# Patient Record
Sex: FEMALE | Race: WHITE | NOT HISPANIC OR LATINO | Employment: PART TIME | URBAN - METROPOLITAN AREA
[De-identification: names, ages, dates, MRNs, and addresses within clinical notes are randomized per-mention and may not be internally consistent; named-entity substitution may affect disease eponyms.]

---

## 2017-01-26 ENCOUNTER — OFFICE VISIT (OUTPATIENT)
Dept: FAMILY MEDICINE | Facility: CLINIC | Age: 47
End: 2017-01-26
Payer: COMMERCIAL

## 2017-01-26 VITALS
WEIGHT: 177.75 LBS | DIASTOLIC BLOOD PRESSURE: 78 MMHG | SYSTOLIC BLOOD PRESSURE: 122 MMHG | HEART RATE: 72 BPM | BODY MASS INDEX: 26.94 KG/M2 | TEMPERATURE: 98.6 F | RESPIRATION RATE: 16 BRPM | HEIGHT: 68 IN

## 2017-01-26 DIAGNOSIS — F90.9 ATTENTION DEFICIT HYPERACTIVITY DISORDER (ADHD), UNSPECIFIED ADHD TYPE: Primary | ICD-10-CM

## 2017-01-26 PROCEDURE — 99213 OFFICE O/P EST LOW 20 MIN: CPT | Performed by: PHYSICIAN ASSISTANT

## 2017-01-26 RX ORDER — METHYLPHENIDATE HYDROCHLORIDE 20 MG/1
20 TABLET ORAL 2 TIMES DAILY
Qty: 60 TABLET | Refills: 0 | Status: SHIPPED | OUTPATIENT
Start: 2017-01-26 | End: 2017-02-23

## 2017-01-26 ASSESSMENT — PAIN SCALES - GENERAL: PAINLEVEL: NO PAIN (0)

## 2017-01-26 NOTE — MR AVS SNAPSHOT
"              After Visit Summary   2017    Isabel Hyatt    MRN: [id number]           Patient Information     Date Of Birth          1970        Visit Information        Provider Department      2017 6:30 PM Caren Carlos PA-C [facility]        Today's Diagnoses     Attention deficit hyperactivity disorder (ADHD), unspecified ADHD type    -  1        Follow-ups after your visit        Who to contact     If you have questions or need follow up information about today's clinic visit or your schedule please contact [facility] directly at [phone].  Normal or non-critical lab and imaging results will be communicated to you by AktiveBayhart, letter or phone within 4 business days after the clinic has received the results. If you do not hear from us within 7 days, please contact the clinic through AktiveBayhart or phone. If you have a critical or abnormal lab result, we will notify you by phone as soon as possible.  Submit refill requests through StageMark or call your pharmacy and they will forward the refill request to us. Please allow 3 business days for your refill to be completed.          Additional Information About Your Visit        MyChart Information     StageMark lets you send messages to your doctor, view your test results, renew your prescriptions, schedule appointments and more. To sign up, go to [email or web address] . Click on \"Log in\" on the left side of the screen, which will take you to the Welcome page. Then click on \"Sign up Now\" on the right side of the page.     You will be asked to enter the access code listed below, as well as some personal information. Please follow the directions to create your username and password.     Your access code is: [email or web address]  Expires: 2017  7:08 PM     Your access code will  in 90 days. If you need help or a new code, please call your [facility] or [phone].        Care EveryWhere ID     This is your " "Care EveryWhere ID. This could be used by other organizations to access your [place] medical records  [id number]        Your Vitals Were     Pulse Temperature Respirations Height BMI (Body Mass Index)       72 98.6  F (37  C) (Tympanic) 16 5' 8\" (1.727 m) 27.03 kg/m2        Blood Pressure from Last 3 Encounters:   01/26/17 122/78   06/27/16 127/74   06/06/16 110/82    Weight from Last 3 Encounters:   01/26/17 177 lb 12 oz (80.627 kg)   06/27/16 174 lb (78.926 kg)   06/06/16 174 lb (78.926 kg)              Today, you had the following     No orders found for display         Where to get your medicines      Some of these will need a paper prescription and others can be bought over the counter.  Ask your nurse if you have questions.     Bring a paper prescription for each of these medications    - methylphenidate 20 MG tablet       Primary Care Provider Office Phone # Fax #    Caren Carlos PA-C [phone]       [address] [place] [address]        Thank you!     Thank you for choosing [facility]  for your care. Our goal is always to provide you with excellent care. Hearing back from our patients is one way we can continue to improve our services. Please take a few minutes to complete the written survey that you may receive in the mail after your visit with us. Thank you!             Your Updated Medication List - Protect others around you: Learn how to safely use, store and throw away your medicines at www.disposemymeds.org.          This list is accurate as of: 1/26/17  7:08 PM.  Always use your most recent med list.                   Brand Name Dispense Instructions for use    Fish Oil 1200 MG Caps      Take 2 capsules by mouth daily       methylphenidate 20 MG tablet    RITALIN    60 tablet    Take 1 tablet (20 mg) by mouth 2 times daily       MULTIPLE VITAMIN PO      Take by mouth daily       vitamin B complex with vitamin C Tabs tablet      " Take 1 tablet by mouth daily       vitamin D 2000 UNITS tablet     180 tablet    2 tablets daily

## 2017-01-27 NOTE — NURSING NOTE
"Chief Complaint   Patient presents with     A.D.H.D     med check       Initial /78 mmHg  Pulse 72  Temp(Src) 98.6  F (37  C) (Tympanic)  Resp 16  Ht 5' 8\" (1.727 m)  Wt 177 lb 12 oz (80.627 kg)  BMI 27.03 kg/m2 Estimated body mass index is 27.03 kg/(m^2) as calculated from the following:    Height as of this encounter: 5' 8\" (1.727 m).    Weight as of this encounter: 177 lb 12 oz (80.627 kg).  BP completed using cuff size: regular  Tarsha Strauss CMA (AAMA)   "

## 2017-01-27 NOTE — PROGRESS NOTES
"  SUBJECTIVE:                                                    Isabel Hyatt is a 46 year old female who presents to clinic today for the following health issues:      Medication Followup of Ritalin  Has been on 20mg twice daily longterm. Would like to continue on current dose.     Taking Medication as prescribed: yes    Side Effects:  None    Medication Helping Symptoms:  yes           Problem list and histories reviewed & adjusted, as indicated.  Additional history: as documented    Problem list, Medication list, Allergies, and Medical/Social/Surgical histories reviewed in [facility] and updated as appropriate.    ROS:  Constitutional, HEENT, cardiovascular, pulmonary, GI, , musculoskeletal, neuro, skin, endocrine and psych systems are negative, except as otherwise noted.    OBJECTIVE:                                                    /78 mmHg  Pulse 72  Temp(Src) 98.6  F (37  C) (Tympanic)  Resp 16  Ht 5' 8\" (1.727 m)  Wt 177 lb 12 oz (80.627 kg)  BMI 27.03 kg/m2  Body mass index is 27.03 kg/(m^2).   GENERAL: alert, no distress and fatigued  HENT: ear canals and TM's normal, nose and mouth without ulcers or lesions  NECK: no adenopathy, no asymmetry, masses, or scars and thyroid normal to palpation  RESP: lungs clear to auscultation - no rales, rhonchi or wheezes  CV: regular rate and rhythm, normal S1 S2, no S3 or S4, no murmur, click or rub, no peripheral edema and peripheral pulses strong  ABDOMEN: soft, nontender, no hepatosplenomegaly, no masses and bowel sounds normal  MS: no gross musculoskeletal defects noted, no edema  SKIN: no suspicious lesions or rashes    Diagnostic Test Results:  none      ASSESSMENT:                                                    Attention deficit hyperactivity disorder (ADHD), unspecified ADHD type      PLAN:                                                        ICD-10-CM    1. Attention deficit hyperactivity disorder (ADHD), unspecified ADHD type F90.9 " "methylphenidate (RITALIN) 20 MG tablet           Continue same dose of Ritalin - f/u in 6 months.    She mentions that she is frustrated with endocrinology visit from last summer - feels she has \"no answer\" surrounding her chronic fatigue.  I once again suggest a psych consult or at least consideration of meeting with a therapist to r/o depression - she is very against this.  I have done a large lab work up which has been negative. Offered to set her up with IM to review all and possibly do more of a work up - she declined.    Caren Carlos PA-C  [facility]    GREATER THAN 50% OF TIME SPENT IN COUNSELING & CARE COORDINATION - TOTAL FACE TO FACE TIME  15 MINUTES.    Orders Placed This Encounter     methylphenidate (RITALIN) 20 MG tablet       Chart documentation done in part with Dragon Voice recognition Software. Although reviewed after completion, some word and grammatical error may remain.  AVS given to patient upon discharge today.  Electronically signed by Caren Carlos PA-C  January 29, 2017  10:59 AM        "

## 2017-02-23 DIAGNOSIS — F90.9 ATTENTION DEFICIT HYPERACTIVITY DISORDER (ADHD), UNSPECIFIED ADHD TYPE: ICD-10-CM

## 2017-02-23 RX ORDER — METHYLPHENIDATE HYDROCHLORIDE 20 MG/1
20 TABLET ORAL 2 TIMES DAILY
Qty: 60 TABLET | Refills: 0 | Status: SHIPPED | OUTPATIENT
Start: 2017-02-23 | End: 2017-03-28

## 2017-02-23 NOTE — TELEPHONE ENCOUNTER
Ritalin   pt wants to   rx at       Last Written Prescription Date: 1/26/2017  Last Fill Quantity: 60,  # refills: 0   Last Office Visit with [facility], [facility] or [facility] prescribing provider: 1/26/2017

## 2017-03-28 DIAGNOSIS — F90.9 ATTENTION DEFICIT HYPERACTIVITY DISORDER (ADHD), UNSPECIFIED ADHD TYPE: ICD-10-CM

## 2017-03-28 NOTE — TELEPHONE ENCOUNTER
ritalin      Last Written Prescription Date: 02/23/17  Last Fill Quantity: 60,  # refills: 0   Last Office Visit with [facility], [facility] or [facility] prescribing provider: 01/26/17

## 2017-03-28 NOTE — TELEPHONE ENCOUNTER
Patients  called to fu on patients RX, stated it was called in yesterday and that patient said it was ready to be picked up.  Was advised of 3 business days for approval.  Thank you,  Kandice Smith  Patient Representative

## 2017-03-29 RX ORDER — METHYLPHENIDATE HYDROCHLORIDE 20 MG/1
20 TABLET ORAL 2 TIMES DAILY
Qty: 60 TABLET | Refills: 0 | Status: SHIPPED | OUTPATIENT
Start: 2017-03-29 | End: 2017-04-13 | Stop reason: ALTCHOICE

## 2017-04-06 ENCOUNTER — TELEPHONE (OUTPATIENT)
Dept: FAMILY MEDICINE | Facility: CLINIC | Age: 47
End: 2017-04-06

## 2017-04-06 NOTE — TELEPHONE ENCOUNTER
Reason for Call:  Same Day Appointment, Requested Provider:  Caren Carlos PA-C    PCP: Caren Carlos    Reason for visit: Patient stated that she has been on this new medication and does not like it. She would not state what one but would like to talk to Dr. Carlos about it.     Duration of symptoms: since she started this new medication     Have you been treated for this in the past? Yes    Additional comments: please call patients cell and let her know when Dr. Carlos will see her. Patient did not want to wait until may.     Can we leave a detailed message on this number? YES    Phone number patient can be reached at: Cell number on file:    Telephone Information:   Mobile [phone]       Best Time: any    Call taken on 4/6/2017 at 4:51 PM by Diana Aguirre

## 2017-04-10 NOTE — TELEPHONE ENCOUNTER
Called pt and informed her that we can see her this Thru at 10 and she states she will try to make it work.  Jessika Stephen MA

## 2017-04-13 ENCOUNTER — OFFICE VISIT (OUTPATIENT)
Dept: FAMILY MEDICINE | Facility: CLINIC | Age: 47
End: 2017-04-13
Payer: COMMERCIAL

## 2017-04-13 VITALS
HEIGHT: 68 IN | TEMPERATURE: 97.8 F | DIASTOLIC BLOOD PRESSURE: 60 MMHG | SYSTOLIC BLOOD PRESSURE: 112 MMHG | BODY MASS INDEX: 27.58 KG/M2 | HEART RATE: 62 BPM | WEIGHT: 182 LBS | RESPIRATION RATE: 16 BRPM

## 2017-04-13 DIAGNOSIS — F90.9 ATTENTION DEFICIT HYPERACTIVITY DISORDER (ADHD), UNSPECIFIED ADHD TYPE: ICD-10-CM

## 2017-04-13 PROCEDURE — 99214 OFFICE O/P EST MOD 30 MIN: CPT | Performed by: PHYSICIAN ASSISTANT

## 2017-04-13 RX ORDER — METHYLPHENIDATE HYDROCHLORIDE 5 MG/1
TABLET ORAL
Qty: 60 TABLET | Refills: 0 | Status: SHIPPED | OUTPATIENT
Start: 2017-04-13 | End: 2017-05-25

## 2017-04-13 RX ORDER — METHYLPHENIDATE HYDROCHLORIDE 27 MG/1
27 TABLET ORAL EVERY MORNING
Qty: 30 TABLET | Refills: 0 | Status: SHIPPED | OUTPATIENT
Start: 2017-04-13 | End: 2017-04-26 | Stop reason: DRUGHIGH

## 2017-04-13 ASSESSMENT — PAIN SCALES - GENERAL: PAINLEVEL: NO PAIN (0)

## 2017-04-13 NOTE — PROGRESS NOTES
"  SUBJECTIVE:                                                    Isabel Hyatt is a 46 year old female who presents to clinic today for the following health issues:      Medication Followup of  Ritalin taking 20 mg twice daily for the last  year - my last visit with this patient was end of January. Was told at that time to follow-up in 6 months. Also had been offered a consultation with internal medicine due to ongoing fatigue with a completely negative workup. She declined that as well as a visit with a psychologist to help sort out any depression causing her symptoms.    Taking Medication as prescribed: yes    Side Effects:  None    Medication Helping Symptoms:  NO-patient would like to try an extended release concerta.     About a year ago had initially started on extended release Concerta. After the first month or 2, she had requested doing plain Ritalin as she felt like the medication \"wore off\" as the day progressed. She and her  have done a lot of research-she is with her today and they are wondering about going back to an extended release in the morning and then possibly having the ability to do a very low dose Ritalin in the afternoon as needed. two of their children do this and it has worked really well.    Overall seems to be doing a little bit better. Has increased her exercise, is sleeping better.        Problem list and histories reviewed & adjusted, as indicated.  Additional history: as documented    Past Medical History:   Diagnosis Date     Thyroid nodule 2014    normal labs - monitor with serial US/labs     Vitamin D deficiency 2012     Past Surgical History:   Procedure Laterality Date     DILATION AND CURETTAGE  1996     DILATION AND CURETTAGE  1999     OOPHORECTOMY  2002     OVARY SURGERY  2002    ovarian cyst previously done 1990     Social History   Substance Use Topics     Smoking status: Never Smoker     Smokeless tobacco: Never Used     Alcohol use No     Family History   Problem " "Relation Age of Onset     DIABETES Maternal Aunt      Thyroid Disease Maternal Aunt      Breast Cancer Mother      Hypertension Father      GARRETT     Hypertension Maternal Grandmother      Hypertension Maternal Grandfather      Hypertension Paternal Grandmother      Hypertension Paternal Grandfather      Other - See Comments Brother      GARRETT      No Known Allergies  Current Outpatient Prescriptions   Medication Sig Dispense Refill     methylphenidate ER (CONCERTA) 27 MG CR tablet Take 1 tablet (27 mg) by mouth every morning 30 tablet 0     methylphenidate (RITALIN) 5 MG tablet Take 1-2 tablets in the afternoon 60 tablet 0     Omega-3 Fatty Acids (FISH OIL) 1200 MG CAPS Take 2 capsules by mouth daily       MULTIPLE VITAMIN PO Take by mouth daily       vitamin  B complex with vitamin C (VITAMIN  B COMPLEX) TABS Take 1 tablet by mouth daily       Cholecalciferol (VITAMIN D) 2000 UNITS tablet 2 tablets daily 180 tablet 3           Reviewed and updated as needed this visit by clinical staff  Tobacco  Allergies  Meds       Reviewed and updated as needed this visit by Provider         ROS:  Constitutional, HEENT, cardiovascular, pulmonary, gi and gu systems are negative, except as otherwise noted.    OBJECTIVE:                                                    /60  Pulse 62  Temp 97.8  F (36.6  C) (Tympanic)  Resp 16  Ht 5' 8\" (1.727 m)  Wt 182 lb (82.6 kg)  BMI 27.67 kg/m2  Body mass index is 27.67 kg/(m^2).   GENERAL: healthy, alert and no distress  She appears much less fatigued from previous visits.  No further PE done.    Diagnostic Test Results:  none      ASSESSMENT:                                                    Attention deficit hyperactivity disorder (ADHD), unspecified ADHD type      PLAN:                                                        ICD-10-CM    1. Attention deficit hyperactivity disorder (ADHD), unspecified ADHD type F90.9 methylphenidate ER (CONCERTA) 27 MG CR tablet     " methylphenidate (RITALIN) 5 MG tablet           Reviewed current dosing of Ritalin-she is taking 20 mg twice daily. She would like to start again with the extended release version in the morning-and is requesting to start back at previous dose of 27 mg. Will also add a second prescription for plain Ritalin 5-10 mg in the afternoon. She will give this one month's time, if she needs a dose adjustment for the morning dose up to 36 mg of the extended release-will let us know. Otherwise she will follow-up in 6 months for next visit surrounding this medication.    Caren Carlos PA-C  [facility]    GREATER THAN 50% OF TIME SPENT IN COUNSELING & CARE COORDINATION; review of hx associated with this diagnosis - TOTAL FACE TO FACE TIME  25 MINUTES.    Orders Placed This Encounter     methylphenidate ER (CONCERTA) 27 MG CR tablet     methylphenidate (RITALIN) 5 MG tablet       Chart documentation done in part with Dragon Voice recognition Software. Although reviewed after completion, some word and grammatical error may remain.  AVS given to patient upon discharge today.  Electronically signed by Caren Carlos PA-C  April 13, 2017  10:57 AM

## 2017-04-13 NOTE — MR AVS SNAPSHOT
"              After Visit Summary   2017    Isabel Hyatt    MRN: [id number]           Patient Information     Date Of Birth          1970        Visit Information        Provider Department      2017 10:00 AM Caren Carlos PA-C [facility]        Today's Diagnoses     Attention deficit hyperactivity disorder (ADHD), unspecified ADHD type           Follow-ups after your visit        Who to contact     If you have questions or need follow up information about today's clinic visit or your schedule please contact [facility] directly at [phone].  Normal or non-critical lab and imaging results will be communicated to you by Exileshart, letter or phone within 4 business days after the clinic has received the results. If you do not hear from us within 7 days, please contact the clinic through Fleckt or phone. If you have a critical or abnormal lab result, we will notify you by phone as soon as possible.  Submit refill requests through Ethical Deal or call your pharmacy and they will forward the refill request to us. Please allow 3 business days for your refill to be completed.          Additional Information About Your Visit        MyChart Information     Ethical Deal lets you send messages to your doctor, view your test results, renew your prescriptions, schedule appointments and more. To sign up, go to [email or web address] . Click on \"Log in\" on the left side of the screen, which will take you to the Welcome page. Then click on \"Sign up Now\" on the right side of the page.     You will be asked to enter the access code listed below, as well as some personal information. Please follow the directions to create your username and password.     Your access code is: [email or web address]  Expires: 2017  8:08 PM     Your access code will  in 90 days. If you need help or a new code, please call your [facility] or [phone].        Care EveryWhere ID     This is your Care " "EveryWhere ID. This could be used by other organizations to access your [place] medical records  [id number]        Your Vitals Were     Pulse Temperature Respirations Height BMI (Body Mass Index)       62 97.8  F (36.6  C) (Tympanic) 16 5' 8\" (1.727 m) 27.67 kg/m2        Blood Pressure from Last 3 Encounters:   04/13/17 112/60   01/26/17 122/78   06/27/16 127/74    Weight from Last 3 Encounters:   04/13/17 182 lb (82.6 kg)   01/26/17 177 lb 12 oz (80.6 kg)   06/27/16 174 lb (78.9 kg)              Today, you had the following     No orders found for display         Today's Medication Changes          These changes are accurate as of: 4/13/17 11:01 AM.  If you have any questions, ask your nurse or doctor.               Start taking these medicines.        Dose/Directions    * methylphenidate ER 27 MG CR tablet   Commonly known as:  CONCERTA   Used for:  Attention deficit hyperactivity disorder (ADHD), unspecified ADHD type   Replaces:  methylphenidate 20 MG tablet   Started by:  Caren Carlos PA-C        Dose:  27 mg   Take 1 tablet (27 mg) by mouth every morning   Quantity:  30 tablet   Refills:  0       * methylphenidate 5 MG tablet   Commonly known as:  RITALIN   Used for:  Attention deficit hyperactivity disorder (ADHD), unspecified ADHD type   Started by:  Caren Carlos PA-C        Take 1-2 tablets in the afternoon   Quantity:  60 tablet   Refills:  0       * Notice:  This list has 2 medication(s) that are the same as other medications prescribed for you. Read the directions carefully, and ask your doctor or other care provider to review them with you.      Stop taking these medicines if you haven't already. Please contact your care team if you have questions.     methylphenidate 20 MG tablet   Commonly known as:  RITALIN   Replaced by:  methylphenidate ER 27 MG CR tablet   Stopped by:  Caren Carlos PA-C                Where to get your medicines      Some of these will need a paper " prescription and others can be bought over the counter.  Ask your nurse if you have questions.     Bring a paper prescription for each of these medications     methylphenidate 5 MG tablet    methylphenidate ER 27 MG CR tablet                Primary Care Provider Office Phone # Fax #    Caren Carlos PA-C [phone]       [address] [place] [address]        Thank you!     Thank you for choosing [facility]  for your care. Our goal is always to provide you with excellent care. Hearing back from our patients is one way we can continue to improve our services. Please take a few minutes to complete the written survey that you may receive in the mail after your visit with us. Thank you!             Your Updated Medication List - Protect others around you: Learn how to safely use, store and throw away your medicines at www.disposemymeds.org.          This list is accurate as of: 4/13/17 11:01 AM.  Always use your most recent med list.                   Brand Name Dispense Instructions for use    Fish Oil 1200 MG Caps      Take 2 capsules by mouth daily       * methylphenidate ER 27 MG CR tablet    CONCERTA    30 tablet    Take 1 tablet (27 mg) by mouth every morning       * methylphenidate 5 MG tablet    RITALIN    60 tablet    Take 1-2 tablets in the afternoon       MULTIPLE VITAMIN PO      Take by mouth daily       vitamin B complex with vitamin C Tabs tablet      Take 1 tablet by mouth daily       vitamin D 2000 UNITS tablet     180 tablet    2 tablets daily       * Notice:  This list has 2 medication(s) that are the same as other medications prescribed for you. Read the directions carefully, and ask your doctor or other care provider to review them with you.

## 2017-04-13 NOTE — NURSING NOTE
"Chief Complaint   Patient presents with     A.D.H.D     med check       Initial /60  Pulse 62  Temp 97.8  F (36.6  C) (Tympanic)  Resp 16  Ht 5' 8\" (1.727 m)  Wt 182 lb (82.6 kg)  BMI 27.67 kg/m2 Estimated body mass index is 27.67 kg/(m^2) as calculated from the following:    Height as of this encounter: 5' 8\" (1.727 m).    Weight as of this encounter: 182 lb (82.6 kg).  Medication Reconciliation: complete   Tarsha Strauss CMA (AAMA)   "

## 2017-04-26 ENCOUNTER — TELEPHONE (OUTPATIENT)
Dept: FAMILY MEDICINE | Facility: CLINIC | Age: 47
End: 2017-04-26

## 2017-04-26 DIAGNOSIS — F90.9 ATTENTION DEFICIT HYPERACTIVITY DISORDER (ADHD), UNSPECIFIED ADHD TYPE: Primary | ICD-10-CM

## 2017-04-26 RX ORDER — METHYLPHENIDATE HYDROCHLORIDE 36 MG/1
36 TABLET ORAL EVERY MORNING
Qty: 30 TABLET | Refills: 0 | Status: SHIPPED | OUTPATIENT
Start: 2017-04-26 | End: 2017-05-25 | Stop reason: DRUGHIGH

## 2017-05-24 ENCOUNTER — TELEPHONE (OUTPATIENT)
Dept: FAMILY MEDICINE | Facility: CLINIC | Age: 47
End: 2017-05-24

## 2017-05-24 DIAGNOSIS — F90.9 ATTENTION DEFICIT HYPERACTIVITY DISORDER (ADHD), UNSPECIFIED ADHD TYPE: Primary | ICD-10-CM

## 2017-05-24 NOTE — TELEPHONE ENCOUNTER
Reason for Call:  Medication or medication refill:    Do you use a [facility]?  Name of the pharmacy and phone number for the current request:  [facility] - [phone]    Name of the medication requested: CONCERTA    Other request: would like to decrease the dose to 27 instead of 36 mg tabs    Can we leave a detailed message on this number? YES    Phone number patient can be reached at: Cell number on file:    Telephone Information:   Mobile [phone]       Best Time: any    Call taken on 5/24/2017 at 5:13 PM by Elida Sterling

## 2017-05-25 RX ORDER — METHYLPHENIDATE HYDROCHLORIDE 5 MG/1
TABLET ORAL
Qty: 60 TABLET | Refills: 0 | Status: SHIPPED | OUTPATIENT
Start: 2017-05-25 | End: 2017-08-31 | Stop reason: DRUGHIGH

## 2017-05-25 RX ORDER — METHYLPHENIDATE HYDROCHLORIDE 27 MG/1
27 TABLET ORAL EVERY MORNING
Qty: 30 TABLET | Refills: 0 | Status: SHIPPED | OUTPATIENT
Start: 2017-05-25 | End: 2017-08-31

## 2017-05-25 NOTE — TELEPHONE ENCOUNTER
I called Isabel and she wants her 5 mg as well however she is wondering if you just want to change it to 10 mg once daily because she is taking 2 of the 5 mg right now.  Please give script to me as I told her I would call her back and she will let me know what she wants me to do with her scripts.

## 2017-05-25 NOTE — TELEPHONE ENCOUNTER
Caren Carlos PA-C see below. If ok please sign and close.  Tarsha Strauss CMA ([facility])

## 2017-08-31 DIAGNOSIS — F90.9 ATTENTION DEFICIT HYPERACTIVITY DISORDER (ADHD), UNSPECIFIED ADHD TYPE: ICD-10-CM

## 2017-08-31 RX ORDER — METHYLPHENIDATE HYDROCHLORIDE 27 MG/1
27 TABLET ORAL EVERY MORNING
Qty: 30 TABLET | Refills: 0 | Status: SHIPPED | OUTPATIENT
Start: 2017-08-31 | End: 2023-08-01 | Stop reason: DRUGHIGH

## 2017-08-31 RX ORDER — METHYLPHENIDATE HYDROCHLORIDE 10 MG/1
10 TABLET ORAL DAILY
Qty: 60 TABLET | Refills: 0 | Status: SHIPPED | OUTPATIENT
Start: 2017-08-31 | End: 2023-08-01 | Stop reason: DRUGHIGH

## 2017-08-31 NOTE — TELEPHONE ENCOUNTER
Called pt to see what she was needing. She states that she hasn't been taking her Concerta or Ritalin cause she has been paying out of her pocket since she hasn't gotten many hours at work and her  has been in and out of the ER. She wanted to know if she can get a rx for Ritalin 10 mg daily instead. Per Caren it's ok for the 10 mg tabs and if she wants to cut them in half to check with the pharmacy to make sure she can do that. She wants to pick them up at the .  Jessika Stephen MA

## 2017-08-31 NOTE — TELEPHONE ENCOUNTER
Reason for Call:  Medication or medication refill:  Medication question    Do you use a [facility]?  Name of the pharmacy and phone number for the current request:  [facility] [phone] (Fax [phone])    Name of the medication requested:  Isabel needs to talk about her Concerta and Ritalin rxs.  Pt states she has to pay for her meds out of pocket so she needs to figure out the most economical way to order them    Other request: NA    Can we leave a detailed message on this number? YES    Phone number patient can be reached at:  [phone] (H)    Best Time: any    Call taken on 8/31/2017 at 3:13 PM by Mahogany Cardona

## 2018-01-16 RX ORDER — CHLORAL HYDRATE 500 MG
CAPSULE ORAL
COMMUNITY
Start: 2013-02-28

## 2018-01-16 RX ORDER — METHYLPHENIDATE HYDROCHLORIDE 18 MG/1
18 TABLET ORAL
COMMUNITY
Start: 2014-06-02 | End: 2023-08-01 | Stop reason: DRUGHIGH

## 2018-01-16 RX ORDER — IBUPROFEN 600 MG/1
600 TABLET, FILM COATED ORAL
COMMUNITY
Start: 2015-08-10

## 2022-04-15 ENCOUNTER — MEDICAL CORRESPONDENCE (OUTPATIENT)
Dept: HEALTH INFORMATION MANAGEMENT | Facility: CLINIC | Age: 52
End: 2022-04-15

## 2022-08-11 ENCOUNTER — MEDICAL CORRESPONDENCE (OUTPATIENT)
Dept: HEALTH INFORMATION MANAGEMENT | Facility: CLINIC | Age: 52
End: 2022-08-11

## 2023-08-01 ENCOUNTER — OFFICE VISIT (OUTPATIENT)
Dept: SLEEP MEDICINE | Facility: CLINIC | Age: 53
End: 2023-08-01
Payer: COMMERCIAL

## 2023-08-01 VITALS
HEIGHT: 68 IN | WEIGHT: 198 LBS | SYSTOLIC BLOOD PRESSURE: 124 MMHG | BODY MASS INDEX: 30.01 KG/M2 | HEART RATE: 74 BPM | DIASTOLIC BLOOD PRESSURE: 81 MMHG | OXYGEN SATURATION: 98 %

## 2023-08-01 DIAGNOSIS — G47.33 OSA (OBSTRUCTIVE SLEEP APNEA): Primary | ICD-10-CM

## 2023-08-01 PROCEDURE — 99203 OFFICE O/P NEW LOW 30 MIN: CPT | Performed by: PHYSICIAN ASSISTANT

## 2023-08-01 RX ORDER — VENLAFAXINE HYDROCHLORIDE 37.5 MG/1
37.5 CAPSULE, EXTENDED RELEASE ORAL DAILY
COMMUNITY
Start: 2023-07-07

## 2023-08-01 RX ORDER — TOLTERODINE 2 MG/1
2 CAPSULE, EXTENDED RELEASE ORAL
COMMUNITY
Start: 2022-09-29

## 2023-08-01 RX ORDER — FAMOTIDINE 20 MG/1
20 TABLET, FILM COATED ORAL 2 TIMES DAILY
COMMUNITY

## 2023-08-01 RX ORDER — METHYLPHENIDATE HYDROCHLORIDE 20 MG/1
20 TABLET ORAL 2 TIMES DAILY
COMMUNITY
Start: 2023-07-05 | End: 2023-08-01 | Stop reason: DRUGHIGH

## 2023-08-01 RX ORDER — METHYLPHENIDATE HYDROCHLORIDE 20 MG/1
20 TABLET ORAL 2 TIMES DAILY
COMMUNITY

## 2023-08-01 ASSESSMENT — SLEEP AND FATIGUE QUESTIONNAIRES
HOW LIKELY ARE YOU TO NOD OFF OR FALL ASLEEP WHILE LYING DOWN TO REST IN THE AFTERNOON WHEN CIRCUMSTANCES PERMIT: HIGH CHANCE OF DOZING
HOW LIKELY ARE YOU TO NOD OFF OR FALL ASLEEP WHILE SITTING QUIETLY AFTER LUNCH WITHOUT ALCOHOL: MODERATE CHANCE OF DOZING
HOW LIKELY ARE YOU TO NOD OFF OR FALL ASLEEP WHEN YOU ARE A PASSENGER IN A CAR FOR AN HOUR WITHOUT A BREAK: MODERATE CHANCE OF DOZING
HOW LIKELY ARE YOU TO NOD OFF OR FALL ASLEEP WHILE SITTING AND READING: HIGH CHANCE OF DOZING
HOW LIKELY ARE YOU TO NOD OFF OR FALL ASLEEP IN A CAR, WHILE STOPPED FOR A FEW MINUTES IN TRAFFIC: MODERATE CHANCE OF DOZING
HOW LIKELY ARE YOU TO NOD OFF OR FALL ASLEEP WHILE SITTING AND TALKING TO SOMEONE: MODERATE CHANCE OF DOZING
HOW LIKELY ARE YOU TO NOD OFF OR FALL ASLEEP WHILE WATCHING TV: HIGH CHANCE OF DOZING
HOW LIKELY ARE YOU TO NOD OFF OR FALL ASLEEP WHILE SITTING INACTIVE IN A PUBLIC PLACE: MODERATE CHANCE OF DOZING

## 2023-08-01 NOTE — PATIENT INSTRUCTIONS
" [facility]   Clinic Office Hours:  Monday-- Thursday   8:00 am -- 4:30 pm CST  Friday  8:00 am-3:30 pm [facility]  Clinic Numbers- [phone]  Sleep Lab Address   [address] [place] [id number]  Sleep Lab Phone: [phone]    Your sleep study has been scheduled for: 12/15/23           Sleep Lab Telephone: [phone]    Thank you for choosing [facility]   Please take your time to read through this information.  This information has been designed to help you understand what it takes to begin your journey to a better night's rest. The contents of this packet include:  Study Preparation Instructions  An outline of your Sleep Study's Procedures.    Our technical staff will accommodate you as much as they are able. However, if you have special needs during the night in which you require a Personal Care Attendant, please make arrangements to bring that person along with you and notify us ahead of time by calling [phone].        Upon arrival please park in the west side parking lot near the Emergency Department Entrance   Enter through the \"Emergency Department\" doors and check in at the Emergency Department desk. Let them know you are here for a sleep study.  Please bring your insurance card and photo ID to the study with you.  You will receive a confirmation call 48 hours prior to your study time. If you do not receive a confirmation call, please call [phone].  If you need to cancel or reschedule for any reason, please contact us 24 hours before your scheduled appointment at [phone].  Please make arrangements to follow-up with your specialist to review your sleep study.  We hope to make your night with us as comfortable and as pleasant as possible. If you have any questions after reading through this packet, please feel free to call us.        TO PREPARE FOR YOUR SLEEP STUDY, PLEASE FOLLOW THE FOLLOWING GUIDELINES:    Please have the " next day available for continued testing in the event this is necessary.    Please bathe and wash your hair the day of the test. Hair should be clean, dry, and free from excessive oils, gels or sprays.  Upon arrival, men should be clean shaven (no stubble). If you have a mustache, goatee, or beard, it is not necessary to shave it off.  Bring comfortable 2-piece sleepwear.  If you prefer, you can bring your own pillow, blanket, orthopedic device, etc.  Arise at your regular time on the morning of your study and DO NOT NAP during the day.  Avoid stimulants (coffee, tea, chocolate, soda pop, energy drinks, any other caffeine drinks) after noon on the day of the study.  Eat a normal evening meal before you arrive for your study.   9.  If you prefer a specific snack before bedtime, please bring it along. We have a refrigerator and a microwave available.  10. Continue to take all prescribed medications unless otherwise instructed by your provider.  11. Bring medications for a 24-hour period, as well as any medications prescribed specifically for your sleep study.  Do not take until the sleep staff advises you to take the medications.   12. If you are incontinent please bring your Adult pads, depends or diapers.  13. Feel free to bring your own toiletries (Our lab will have them if you forget).  14. Please DO NOT wear gel nail polish or gel covering, it is ideal not to have any polish on at all, this can cause discrepancies to the equipment that is utilized to perform your sleep study.  15. If you use a C-PAP Machine, please refrain from using it for 3 days ( 72 hours) prior to the sleep study.       1. Arrival      Please arrive at [facility] at 8:00 P.M. to complete any necessary paperwork.  2. Interview      After settling into your room, your technologist will explain what will happen during your study. Please feel free to ask any questions about your study. At this time, your  technologist will collect your medication list and sleep diary.  3. Patient Set-up      The entire process is non-invasive and painless. We will be measuring your head and placing electrodes on your head and face. This will allow us to monitor your sleep. In addition, we will place monitors and sensors on your body that will monitor your breathing, limb movements, snoring and heart rate. Your set-up will take about 45 minutes to 1 hour to complete.  Lights Out  When it is time for bed, your technologist will help you get comfortable. Comfort is essential for a good night's sleep. If you are uncomfortable in any way, please tell your technologist. Before turning out the lights, your technologist will check your equipment. These checks will help you to have the best study possible. After the lights are turned off, you are free to sleep in whatever position is most comfortable to you. Please note: at some point during the night, you will be asked to sleep on your back. If you are unable to sleep on your back, please let your technologist know. Also, even though you will be connected to wires, you will be able to use the restroom when needed.  **Remember: this is a medical test so phones and television will be turned off for this test.  5. Lights On  The technologist will allow you to sleep in until 6:00 - 7:00 A.M. If you have a specific wake-up time requirement, please notify the technologist so that he or she may accommodate your needs. All the water-soluble conduction gel, electrodes and monitoring devices are easily removed. Upon completion, you will want to shower before you start your day. We do supply soap, shampoo and towels, though, if you prefer to bring your own supplies, please feel free to do so. Please be aware that your technologist will not be able to disclose any information regarding the findings of your study. This information will come from your provider at your follow-up appt.  Please schedule  follow up appointment two weeks after your sleep study, if a follow up has not already been scheduled for your results.       CANCELLATION / RESCHEDULING    Our care team at [facility], strives to provide exceptional - high quality care to all of our customers.     We have set aside our technical experts time and a room especially for you to perform your sleep study.     Working together to serve your comprehensive sleep health needs is very important to us. However, we do understand that changing an appointment is sometimes necessary.    If you find that you are unable to make your scheduled appointment, please contact us directly at least 24-hours prior to your scheduled sleep study at [phone]. If you are greeted by our answering system, please spell your name, give your date of birth and leave a detailed message. If you are calling the day of your sleep study and it is after 4:30, please call [phone] and leave a detailed message.     If you cancel with less than a 24-hour notice or do not show up for your sleep study without prior notification, we will send a letter to your referring provider and we will contact you to assist in rescheduling your study.            Thank you for choosing [facility] for your sleep health needs.    PLEASE BE AWARE [facility] ARE NOT RESPONSIBLE FOR ANY ITEMS LOST OR LEFT DURING YOUR STAY.             MY TREATMENT INFORMATION FOR SLEEP APNEA-  Isabel Hyatt    DOCTOR : OBINNA Carr-THOM    Am I having a sleep study at a sleep center?  --->Due to normal delays, you will be contacted within 2-4 weeks to schedule    Am I having a home sleep study?  --->Watch the video for the device you are using:    -/drop off device-   https://www.[employer].com/watch?v=yGGFBdELGhk    -Disposable device sent out require phone/computer application-   https://www.youtube.com/watch?v=BCce_vbiwxE      Frequently  "asked questions:  1. What is Obstructive Sleep Apnea (GARRETT)? GARRETT is the most common type of sleep apnea. Apnea means, \"without breath.\"  Apnea is most often caused by narrowing or collapse of the upper airway as muscles relax during sleep.   Almost everyone has occasional apneas. Most people with sleep apnea have had brief interruptions at night frequently for many years.  The severity of sleep apnea is related to how frequent and severe the events are.   2. What are the consequences of GARRETT? Symptoms include: feeling sleepy during the day, snoring loudly, gasping or stopping of breathing, trouble sleeping, and occasionally morning headaches or heartburn at night.  Sleepiness can be serious and even increase the risk of falling asleep while driving. Other health consequences may include development of high blood pressure and other cardiovascular disease in persons who are susceptible. Untreated GARRETT  can contribute to heart disease, stroke and diabetes.   3. What are the treatment options? In most situations, sleep apnea is a lifelong disease that must be managed with daily therapy. Medications are not effective for sleep apnea and surgery is generally not considered until other therapies have been tried. Your treatment is your choice . Continuous Positive Airway (CPAP) works right away and is the therapy that is effective in nearly everyone. An oral device to hold your jaw forward is usually the next most reliable option. Other options include postioning devices (to keep you off your back), weight loss, and surgery including a tongue pacing device. There is more detail about some of these options below.  4. Are my sleep studies covered by insurance? Although we will request verification of coverage, we advise you also check in advance of the study to ensure there is coverage.    Important tips for those choosing CPAP and similar devices  For new devices, sign up for device LIZZIE to monitor your device for your followup " visits  We encourage you to utilize the [employer] [place] or website (myAir web (resmed.com) ) to monitor your therapy progress and share the data with your healthcare team when you discuss your sleep apnea.                                                    Know your equipment:  CPAP is continuous positive airway pressure that prevents obstructive sleep apnea by keeping the throat from collapsing while you are sleeping. In most cases, the device is  smart  and can slowly self-adjusts if your throat collapses and keeps a record every day of how well you are treated-this information is available to you and your care team.  BPAP is bilevel positive airway pressure that keeps your throat open and also assists each breath with a pressure boost to maintain adequate breathing.  Special kinds of BPAP are used in patients who have inadequate breathing from lung or heart disease. In most cases, the device is  smart  and can slowly self-adjusts to assist breathing. Like CPAP, the device keeps a record of how well you are treated.  Your mask is your connection to the device. You get to choose what feels most comfortable and the staff will help to make sure if fits. Here: are some examples of the different masks that are available:       Key points to remember on your journey with sleep apnea:  Sleep study.  PAP devices often need to be adjusted during a sleep study to show that they are effective and adjusted right.  Good tips to remember: Try wearing just the mask during a quiet time during the day so your body adapts to wearing it. A humidifier is recommended for comfort in most cases to prevent drying of your nose and throat. Allergy medication from your provider may help you if you are having nasal congestion.  Getting settled-in. It takes more than one night for most of us to get used to wearing a mask. Try wearing just the mask during a quiet time during the day so your body adapts to wearing it. A humidifier is  recommended for comfort in most cases. Our team will work with you carefully on the first day and will be in contact within 4 days and again at 2 and 4 weeks for advice and remote device adjustments. Your therapy is evaluated by the device each day.   Use it every night. The more you are able to sleep naturally for 7-8 hours, the more likely you will have good sleep and to prevent health risks or symptoms from sleep apnea. Even if you use it 4 hours it helps. Occasionally all of us are unable to use a medical therapy, in sleep apnea, it is not dangerous to miss one night.   Communicate. Call our skilled team on the number provided on the first day if your visit for problems that make it difficult to wear the device. Over 2 out of 3 patients can learn to wear the device long-term with help from our team. Remember to call our team or your sleep providers if you are unable to wear the device as we may have other solutions for those who cannot adapt to mask CPAP therapy. It is recommended that you sleep your sleep provider within the first 3 months and yearly after that if you are not having problems.   Use it for your health. We encourage use of CPAP masks during daytime quiet periods to allow your face and brain to adapt to the sensation of CPAP so that it will be a more natural sensation to awaken to at night or during naps. This can be very useful during the first few weeks or months of adapting to CPAP though it does not help medically to wear CPAP during wakefulness and  should not be used as a strategy just to meet guidelines.  Take care of your equipment. Make sure you clean your mask and tubing using directions every day and that your filter and mask are replaced as recommended or if they are not working.     BESIDES CPAP, WHAT OTHER THERAPIES ARE THERE?    Positioning Device  Positioning devices are generally used when sleep apnea is mild and only occurs on your back.This example shows a pillow that straps  around the waist. It may be appropriate for those whose sleep study shows milder sleep apnea that occurs primarily when lying flat on one's back. Preliminary studies have shown benefit but effectiveness at home may need to be verified by a home sleep test. These devices are generally not covered by medical insurance.  Examples of devices that maintain sleeping on the back to prevent snoring and mild sleep apnea.    Belt type body positioner  [email or web address]    Electronic reminder  http://nightshifttherapy.com/            Oral Appliance  What is oral appliance therapy?  An oral appliance device fits on your teeth at night like a retainer used after having braces. The device is made by a specialized dentist and requires several visits over 1-2 months before a manufactured device is made to fit your teeth and is adjusted to prevent your sleep apnea. Once an oral device is working properly, snoring should be improved. A home sleep test may be recommended at that time if to determine whether the sleep apnea is adequately treated.       Some things to remember:  -Oral devices are often, but not always, covered by your medical insurance. Be sure to check with your insurance provider.   -If you are referred for oral therapy, you will be given a list of specialized dentists to consider or you may choose to visit the Web site of the American Academy of Dental Sleep Medicine  -Oral devices are less likely to work if you have severe sleep apnea or are extremely overweight.     More detailed information  An oral appliance is a small acrylic device that fits over the upper and lower teeth  (similar to a retainer or a mouth guard). This device slightly moves jaw forward, which moves the base of the tongue forward, opens the airway, improves breathing for effective treat snoring and obstructive sleep apnea in perhaps 7 out of 10 people .  The best working devices are custom-made by a dental device  after a mold is  made of the teeth 1, 2, 3.  When is an oral appliance indicated?  Oral appliance therapy is recommended as a first-line treatment for patients with primary snoring, mild sleep apnea, and for patients with moderate sleep apnea who prefer appliance therapy to use of CPAP4, 5. Severity of sleep apnea is determined by sleep testing and is based on the number of respiratory events per hour of sleep.   How successful is oral appliance therapy?  The success rate of oral appliance therapy in patients with mild sleep apnea is 75-80% while in patients with moderate sleep apnea it is 50-70%. The chance of success in patients with severe sleep apnea is 40-50%. The research also shows that oral appliances have a beneficial effect on the cardiovascular health of GARRETT patients at the same magnitude as CPAP therapy7.  Oral appliances should be a second-line treatment in cases of severe sleep apnea, but if not completely successful then a combination therapy utilizing CPAP plus oral appliance therapy may be effective. Oral appliances tend to be effective in a broad range of patients although studies show that the patients who have the highest success are females, younger patients, those with milder disease, and less severe obesity. 3, 6.   Finding a dentist that practices dental sleep medicine  Specific training is available through the American Academy of Dental Sleep Medicine for dentists interested in working in the field of sleep. To find a dentist who is educated in the field of sleep and the use of oral appliances, near you, visit the Web site of the American Academy of Dental Sleep Medicine.    References  1. Veronica, et al. Objectively measured vs self-reported compliance during oral appliance therapy for sleep-disordered breathing. Chest 2013; 144(5): 5610-2641.  2. Misti et al. Objective measurement of compliance during oral appliance therapy for sleep-disordered breathing. Thorax 2013; 68(1): 91-96.  3. Bryan  et al. Mandibular advancement devices in 620 men and women with GARRETT and snoring: tolerability and predictors of treatment success. Chest 2004; 125: 2901-2628.  4. Josué et al. Oral appliances for snoring and GARRETT: a review. Sleep 2006; 29: 244-262.  5. Michelle et al. Oral appliance treatment for GARRETT: an update. J Clin Sleep Med 2014; 10(2): 215-227.  6. Munira et al. Predictors of OSAH treatment outcome. J Dent Res 2007; 86: 7342-2036.      Weight Loss:    Weight loss is a long-term strategy that may improve sleep apnea in some patients.    Weight management is a personal decision and the decision should be based on your interest and the potential benefits.  If you are interested in exploring weight loss strategies, the following discussion covers the impact on weight loss on sleep apnea and the approaches that may be successful.    Being overweight does not necessarily mean you will have health consequences.  Those who have BMI over 35 or over 27 with existing medical conditions carries greater risk.   Weight loss decreases severity of sleep apnea in most people with obesity. For those with mild obesity who have developed snoring with weight gain, even 15-30 pound weight loss can improve and occasionally eliminate sleep apnea.  Structured and life-long dietary and health habits are necessary to lose weight and keep healthier weight levels.     Though there may be significant health benefits from weight loss, long-term weight loss is very difficult to achieve- studies show success with dietary management in less than 10% of people. In addition, substantial weight loss may require years of dietary control and may be difficult if patients have severe obesity. In these cases, surgical management may be considered.  Finally, older individuals who have tolerated obesity without health complications may be less likely to benefit from weight loss strategies.      [unfilled]    Surgery:    Surgery for obstructive  sleep apnea is considered generally only when other therapies fail to work. Surgery may be discussed with you if you are having a difficult time tolerating CPAP and or when there is an abnormal structure that requires surgical correction.  Nose and throat surgeries often enlarge the airway to prevent collapse.  Most of these surgeries create pain for 1-2 weeks and up to half of the most common surgeries are not effective throughout life.  You should carefully discuss the benefits and drawbacks to surgery with your sleep provider and surgeon to determine if it is the best solution for you.   More information  Surgery for GARRETT is directed at areas that are responsible for narrowing or complete obstruction of the airway during sleep.  There are a wide range of procedures available to enlarge and/or stabilize the airway to prevent blockage of breathing in the three major areas where it can occur: the palate, tongue, and nasal regions.  Successful surgical treatment depends on the accurate identification of the factors responsible for obstructive sleep apnea in each person.  A personalized approach is required because there is no single treatment that works well for everyone.  Because of anatomic variation, consultation with an examination by a sleep surgeon is a critical first step in determining what surgical options are best for each patient.  In some cases, examination during sedation may be recommended in order to guide the selection of procedures.  Patients will be counseled about risks and benefits as well as the typical recovery course after surgery. Surgery is typically not a cure for a person s GARRETT.  However, surgery will often significantly improve one s GARRETT severity (termed  success rate ).  Even in the absence of a cure, surgery will decrease the cardiovascular risk associated with OSA7; improve overall quality of life8 (sleepiness, functionality, sleep quality, etc).      Palate Procedures:  Patients with  GARRETT often have narrowing of their airway in the region of their tonsils and uvula.  The goals of palate procedures are to widen the airway in this region as well as to help the tissues resist collapse.  Modern palate procedure techniques focus on tissue conservation and soft tissue rearrangement, rather than tissue removal.  Often the uvula is preserved in this procedure. Residual sleep apnea is common in patient after pharyngoplasty with an average reduction in sleep apnea events of 33%2.      Tongue Procedures:  ExamWhile patients are awake, the muscles that surround the throat are active and keep this region open for breathing. These muscles relax during sleep, allowing the tongue and other structures to collapse and block breathing.  There are several different tongue procedures available.  Selection of a tongue base procedure depends on characteristics seen on physical exam.  Generally, procedures are aimed at removing bulky tissues in this area or preventing the back of the tongue from falling back during sleep.  Success rates for tongue surgery range from 50-62%3.    Hypoglossal Nerve Stimulation:  Hypoglossal nerve stimulation has recently received approval from the [place] Food and Drug Administration for the treatment of obstructive sleep apnea.  This is based on research showing that the system was safe and effective in treating sleep apnea6.  Results showed that the median AHI score decreased 68%, from 29.3 to 9.0. This therapy uses an implant system that senses breathing patterns and delivers mild stimulation to airway muscles, which keeps the airway open during sleep.  The system consists of three fully implanted components: a small generator (similar in size to a pacemaker), a breathing sensor, and a stimulation lead.  Using a small handheld remote, a patient turns the therapy on before bed and off upon awakening.    Candidates for this device must be greater than 18 years of age, have moderate  to severe GARRETT (AHI between 15-65), BMI less than 35, have tried CPAP/oral appliance for at least 8 weeks without success, and have appropriate upper airway anatomy (determined by a sleep endoscopy performed by Dr. Mike Dacosta).    Hypoglossal Nerve Stimulation Pathway:    The sleep surgeon s office will work with the patient through the insurance prior-authorization process (including communications and appeals).    Nasal Procedures:  Nasal obstruction can interfere with nasal breathing during the day and night.  Studies have shown that relief of nasal obstruction can improve the ability of some patients to tolerate positive airway pressure therapy for obstructive sleep apnea1.  Treatment options include medications such as nasal saline, topical corticosteroid and antihistamine sprays, and oral medications such as antihistamines or decongestants. Non-surgical treatments can include external nasal dilators for selected patients. If these are not successful by themselves, surgery can improve the nasal airway either alone or in combination with these other options.      Combination Procedures:  Combination of surgical procedures and other treatments may be recommended, particularly if patients have more than one area of narrowing or persistent positional disease.  The success rate of combination surgery ranges from 66-80%2,3.    References  Jerry TOMAS. The Role of the Nose in Snoring and Obstructive Sleep Apnoea: An Update.  Eur Arch Otorhinolaryngol. 2011; 268: 1365-73.   Miriam SM; Lucrecia JA; Kim JR; Pallanch JF; Norberto MB; Serg SG; Anjelica CRANDALL. Surgical modifications of the upper airway for obstructive sleep apnea in adults: a systematic review and meta-analysis. SLEEP 2010;33(10):9850-9760. Rita SOLORIO. Hypopharyngeal surgery in obstructive sleep apnea: an evidence-based medicine review.  Arch Otolaryngol Head Neck Surg. 2006 Feb;132(2):206-13.  Richard YH1, Alycia Y, Ridge RIGOBERTO. The efficacy of anatomically based  multilevel surgery for obstructive sleep apnea. Otolaryngol Head Neck Surg. 2003 Oct;129(4):327-35.  Rita SOLORIO, Goldberg A. Hypopharyngeal Surgery in Obstructive Sleep Apnea: An Evidence-Based Medicine Review. Arch Otolaryngol Head Neck Surg. 2006 Feb;132(2):206-13.  Scott RICO et al. Upper-Airway Stimulation for Obstructive Sleep Apnea.  N Engl J Med. 2014 Jan 9;370(2):139-49.  Dana Y et al. Increased Incidence of Cardiovascular Disease in Middle-aged Men with Obstructive Sleep Apnea. Am J Respir Crit Care Med; 2002 166: 159-165  Villa EM et al. Studying Life Effects and Effectiveness of Palatopharyngoplasty (SLEEP) study: Subjective Outcomes of Isolated Uvulopalatopharyngoplasty. Otolaryngol Head Neck Surg. 2011; 144: 623-631.        WHAT IF I ONLY HAVE SNORING?    Mandibular advancement devices, lateral sleep positioning, long-term weight loss and treatment of nasal allergies have been shown to improve snoring.  Exercising tongue muscles with a game (https://apps.Frolik/us/erick/soundly-reduce-snoring/fv7203544419) or stimulating the tongue during the day with a device (https://doi.org/10.3390/hqw04645131) have improved snoring in some individuals.    Remember to Drive Safe... Drive Alive     [facility]  Cognitive Behavioral Therapy for Insomnia       Core Sleep Training Module    Now that you understand a bit more about how sleep works and what causes insomnia, you are ready to begin CBT-I Core Sleep Training.  This process involves five key strategies that will:    Strengthen your sleep drive and circadian sleep rhythm  Strengthen the link between your bed and sleep    It will be important that you continue to keep track of your sleep using your Insomnia  [place] or your paper sleep diary.      Core Sleep Strategies    Much like physical activity and healthy eating strengthens our body, studies show that the following Core strategies are key to achieving stronger, healthier sleep. The focus is on  quality of sleep (not quantity) and improving how you feel during the day.     Reduce Your Time in Bed    Spending extra time in bed trying to get more sleep can cause more sleep disruption and weaken sleep efficiency. Sleep efficiency is simply the percentage of time a person spends asleep while in bed. Normal sleep efficiency is thought to be 85% or greater.  By reducing your time in bed, you will be awake longer during the day leading to quicker and deeper sleep at night. This strategy does not reduce the amount of sleep you get, just the time you are awake in bed.                                             Your provider has recommended the following initial sleep schedule determined by your  estimate of average sleep time over the past two weeks plus 30 minutes.  It also consider the best time for you to sleep.     Your Sleep Schedule Prescription                       Total Time in Bed:  6 hours                     Bedtime:  No earlier than 11                      Wake-up Time:  Out of bed every day by 5      Don't go to bed until you feel sleepy (not tired or fatigued)     This helps increase your sleep drive by keeping you awake longer.  If you go to bed when you're not sleepy, it gives your brain the wrong message and can lead to frustration.     If you feel sleepy before your prescribed bedtime, find activities that can help you stay awake until it is time for you to go to bed. Even brief naps in the evening can be very disruptive of sleep at night.      Don't stay in bed unless you are sleeping      If you are unable to fall asleep or return to sleep after about 30 minutes, get out of bed. This helps train your brain that the bed is for sleep. It is harmful to your sleep when you are worried or frustrated in bed. Do not read, eat, worry, think about sleep, use mobile phones or tablets, or watch TV in bed. Do not watch the clock during the night.     Go to another room and do something relaxing. Plan  things you can do when you get out of bed. Avoid use of mobile devices or computers when out of bed.    Return to bed only when you feel sleepy again.  Repeat as often as needed throughout the night.      Get out of bed at the same time every day    Getting up at the same time helps set your biological clock. It is important to stick to your wake time no matter how much sleep you got the night before or how you feel in the morning.    Varying your wake can have the same effect as jet lag.  It disrupts your biological clock and makes you feel more tired and exhausted.  Trying to  catch up  on sleep on the weekends only makes things worse and sets you up for a cycle of poor sleep the following weekdays.      Make sure you set an alarm and keep it away from the bed to prevent looking at the clock during the night.       Avoid daytime napping    Avoid daytime napping if possible. Napping partially fulfills your need for sleep and weakens your sleep drive at night.    However, if you find yourself sleepy (not just tired) you can take a brief 15-30-minute nap during the day if needed.  Naps within 7-8 hours of your prescribed wake time are less likely to affect your sleep the coming night.  Sleepy people make more mistakes and may hurt themselves or others. Therefore, safety trumps all other sleep prescriptions.  Never drive or operate equipment if drowsy or sleepy.     Changing Your Sleep Schedule Prescription    After a week, you can adjust your sleep schedule prescription based on how well you are sleeping. Use the following guidelines to change your prescribed time in bed based on information from your Insomnia  [place] or paper sleep diary.          Increase Time in Bed by 15 Minutes IF:    Your Insomnia  [place] progress tracker estimates that your sleep efficiency has been greater than 90% over the past week (press Progress icon on the home screen and swipe left for this value).    OR you are falling asleep in  less than 30 minutes AND awake less than 30 minutes during the night.              Decrease Time in Bed by 15 Minutes IF:    Your Insomnia  [place] sleep diary progress tracker estimates that your sleep efficiency has been less than 80% over the past week (press Progress icon on the home screen and swipe left for this value).    OR it is taking longer than 30 minutes to fall asleep OR you are awake more than 30 minutes during the night.      DO NOT decrease your sleep schedule below 5.5  hours     Change is Challenging    Research shows that making significant changes in sleep habits improves sleep quality and how you feel. Improvement takes time and is not always steady. Change is challenging and can be stressful.  This is especially true if old habits - like spending more time in bed -- were a way to avoid worry about getting enough sleep.        Sleep health profoundly affects your health, mood, and your safety.  Thirty three percent of the population (one in three of us) is not getting enough sleep and many have a sleep disorder. Not getting enough sleep or having an untreated / undertreated sleep condition may make us sleepy without even knowing it. In fact, our driving could be dramatically impaired due to our sleep health. As your provider, here are some things I would like you to know about driving:     Here are some warning signs for impairment and dangerous drowsy driving:              -Having been awake more than 16 hours               -Looking tired               -Eyelid drooping              -Head nodding (it could be too late at this point)              -Driving for more than 30 minutes     Some things you could do to make the driving safer if you are experiencing some drowsiness:              -Stop driving and rest              -Call for transportation              -Make sure your sleep disorder is adequately treated     Some things that have been shown NOT to work when experiencing drowsiness while  driving:              -Turning on the radio              -Opening windows              -Eating any  distracting  /  entertaining  foods (e.g., sunflower seeds, candy, or any other)              -Talking on the phone      Your decision may not only impact your life, but also the life of others. Please, remember to drive safe for yourself and all of us.         [facility]      Treating Insomnia  Good sleeping habits are a key part of treatment. If needed, some medications may help you sleep better at first. Making healthy lifestyle changes and learning to relax can improve your sleep. Treating insomnia takes commitment, but trust that your efforts will pay off. Talk to your doctor before taking any medication.    Healthy Lifestyle  Your lifestyle affects your health and your sleep. Here are some healthy habits:  Keep a regular sleep schedule. Go to bed and get up at the same time each day.  Exercise regularly. It may help you reduce stress. Avoid strenuous exercise for two to four hours before bedtime.  Avoid or limit naps.  Use your bed only for sleep and sex.  Don t spend too much time in bed trying to fall asleep. If you can t fall asleep, get up and do something until you become tired and drowsy.  Avoid or limit caffeine and nicotine. They can keep you awake at night. Also avoid alcohol. It may help you fall asleep at first, but your sleep will not be restful.    Before Bedtime  To sleep better every night, try these tips:  Have a bedtime routine to let your body and mind know when it s time to sleep.  Going to bed should be relaxing so try to do only relaxing things around bedtime. Sleep will come sooner.  If your worries don t let you sleep, write them down in a diary. Then close it, and go to bed.  Make sure the room is not too hot or too cold. If it s not dark enough, an eye mask can help. If it s noisy, try using earplugs.    Learn to Relax  Stress, anxiety, and body tension may keep you awake  at night. To unwind before bedtime, try reading a book, meditation, or yoga. Also, try the following:  Deep breathing. Sit or lie back in a chair. Take a slow, deep breath. Hold it for 5 counts. Then breathe out slowly through your mouth. Keep doing this until you feel relaxed.  Imagery. Think of the last fun trip you took. In your mind, walk through the trip from start to finish. Put as much detail into the memory as you can remember. It will help you relax.    Cognitive Behavioral Treatment (CBT)  CBT is the most effective treatment for long-term insomnia. It tries to address the underlying causes of your sleep problems, including your habits and how you think about sleep.      Individual Therapy   [facility] Ge[facility]    [phone]     [address] [address]      Online Programs   www.FastCall (pronounced shut eye). There is a fee for this program. Enter the code  [place]  if you decide to enroll in this program.    www.sleepIO.com (pronounced sleep ee oh). There is a fee for this program. Enter the code  [place]  if you decide to enroll in this program.     Suggested Resources  Insomnia Treatment Books   Overcoming Insomnia by Colin Whiteside and Leann Villasenor (2008)  No More Sleepless Nights by Michael Bunn and Pat Smith (1996)  Say Travis to Insomnia by Lenny Landis (2009)  The Insomnia Workbook by Rhea Early and Bijan Welch (2009)  The Insomnia Answer by Dagoberto Solares and Justice Pradhan (2006)      Stress Management and Relaxation Books  The Relaxation and Stress Reduction Workbook by Jenna Page, Jeanine Esteban and Rickey Mckenzie (2008)  Stress Management Workbook: Techniques and Self-Assessment Procedures by Macie Ascencio and Eyad Santana (1997)  A Mindfulness-Based Stress Reduction Workbook by Carmelo Dangelo and Katty Castaneda (2010)  The Complete Stress Management Workbook by Manohar Rangel, Greg Harman and Adan Lin (1996)  Assert  Yourself by Izabella Bess and Harish Bess (1977)    Relaxation Resources for Computer Download   These websites offer resources to help you relax. This list is for information only. [facility] is not responsible for the quality of services or the actions of any person or organization.  Progressive Muscle Relaxation (PMR):   http://www.LeBUZZ/progressive-muscle-relaxation-exercise.html   http://studentsupport.[facility]/counseling/resources/self-help/relaxation-and-stress-management/   Deep Breathing Exercises:  http://www.LeBUZZ/breathing-awareness.html     Meditation:   www.Mambu  www.ParcelGenieguidedWorldPassKeymeditation-site.com You may have to pay for some of these resources.    Guided Imagery:  http://www.LeBUZZ/guided-imagery-scripts.html   http://[employer]/library/lldefwowfq-cjibfs-jvdznxa/
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
## 2023-12-15 ENCOUNTER — THERAPY VISIT (OUTPATIENT)
Dept: SLEEP MEDICINE | Facility: CLINIC | Age: 53
End: 2023-12-15
Payer: COMMERCIAL

## 2023-12-15 DIAGNOSIS — G47.33 OSA (OBSTRUCTIVE SLEEP APNEA): ICD-10-CM

## 2023-12-15 PROCEDURE — 95810 POLYSOM 6/> YRS 4/> PARAM: CPT | Performed by: OTOLARYNGOLOGY

## 2023-12-15 ASSESSMENT — SLEEP AND FATIGUE QUESTIONNAIRES
HOW LIKELY ARE YOU TO NOD OFF OR FALL ASLEEP WHILE SITTING AND TALKING TO SOMEONE: SLIGHT CHANCE OF DOZING
HOW LIKELY ARE YOU TO NOD OFF OR FALL ASLEEP WHILE SITTING QUIETLY AFTER LUNCH WITHOUT ALCOHOL: MODERATE CHANCE OF DOZING
HOW LIKELY ARE YOU TO NOD OFF OR FALL ASLEEP WHILE LYING DOWN TO REST IN THE AFTERNOON WHEN CIRCUMSTANCES PERMIT: MODERATE CHANCE OF DOZING
HOW LIKELY ARE YOU TO NOD OFF OR FALL ASLEEP WHILE WATCHING TV: HIGH CHANCE OF DOZING
HOW LIKELY ARE YOU TO NOD OFF OR FALL ASLEEP WHILE SITTING AND READING: HIGH CHANCE OF DOZING
HOW LIKELY ARE YOU TO NOD OFF OR FALL ASLEEP WHILE SITTING INACTIVE IN A PUBLIC PLACE: MODERATE CHANCE OF DOZING
HOW LIKELY ARE YOU TO NOD OFF OR FALL ASLEEP WHEN YOU ARE A PASSENGER IN A CAR FOR AN HOUR WITHOUT A BREAK: MODERATE CHANCE OF DOZING
HOW LIKELY ARE YOU TO NOD OFF OR FALL ASLEEP IN A CAR, WHILE STOPPED FOR A FEW MINUTES IN TRAFFIC: MODERATE CHANCE OF DOZING

## 2023-12-26 LAB — SLPCOMP: NORMAL

## 2023-12-27 NOTE — PROGRESS NOTES
Does Isabel have a CPAP/Bipap?  No     [place] Sleep Scale:  20    Sleep Study Follow-Up Visit:    Date on this visit: 1/2/2024    Isabel Hyatt comes in today for follow-up of her sleep study done on 12/15/23 at the [facility] for excessive daytime sleepiness and possible sleep apnea.    Sleep latency 1.1 minutes without Ambien.  REM achieved.   REM latency 37.5 minutes.  Sleep efficiency 94.3%. Total sleep time 478.5 minutes.    Sleep architecture:  Stage 1, 5.4% (5%), stage 2, 42% (45-55%), stage 3, 21.4% (15-20%), stage REM, 31.1% (20-25%).  AHI was 14.9, with mild desaturations. RDI 18.1.  REM RDI 38.3, consistent with severe REM GARRETT.  Supine RDI 20.4, consistent with moderate SUPINE GARRETT.  Periodic Limb Movement Index 0/hour.         These findings were reviewed with patient.     Past medical/surgical history, family history, social history, medications and allergies were reviewed.      Problem List:  Patient Active Problem List    Diagnosis Date Noted    Tension headache 06/15/2016     Priority: Medium    Attention deficit hyperactivity disorder (ADHD), unspecified ADHD type 03/10/2016     Priority: Medium    CARDIOVASCULAR SCREENING; LDL GOAL LESS THAN 160 11/19/2014     Priority: Medium    Thyroid nodule      Priority: Medium     normal labs - monitor with serial US/labs      Vitamin D deficiency 10/22/2014     Priority: Medium     Problem list name updated by automated process. Provider to review      Heavy periods 10/22/2014     Priority: Medium    Enlarged thyroid 10/22/2014     Priority: Medium    Anxiety state 05/29/2014     Priority: Medium    Insomnia due to mental disorder 05/29/2014     Priority: Medium    PMDD (premenstrual dysphoric disorder) 05/05/2014     Priority: Medium    Dysmenorrhea 08/13/2013     Priority: Medium    PMS (premenstrual syndrome) 08/13/2013     Priority: Medium    Myalgia 05/28/2013     Priority: Medium    Depression, major, recurrent (H24) 12/09/2011      Priority: Medium    Fatigue 12/09/2011     Priority: Medium    Tremor 12/09/2011     Priority: Medium        Impression/Plan:    Mild to moderate sleep apnea with mild hypoxemia. Discussed CPAP, oral appliance, sleep positioning. If daytime sleepiness continues despite adequate treatment of sleep apnea will consider further testing for narcolepsy. If she would like to proceed with treatment she will contact the sleep center.   She will follow up with me as needed.     Fifteen minutes spent with patient, all of which were spent face-to-face counseling, consulting, coordinating plan of care.          CC: Dawna Argueta

## 2024-01-02 ENCOUNTER — OFFICE VISIT (OUTPATIENT)
Dept: SLEEP MEDICINE | Facility: CLINIC | Age: 54
End: 2024-01-02
Payer: COMMERCIAL

## 2024-01-02 VITALS
OXYGEN SATURATION: 99 % | WEIGHT: 201.8 LBS | HEART RATE: 74 BPM | SYSTOLIC BLOOD PRESSURE: 110 MMHG | DIASTOLIC BLOOD PRESSURE: 80 MMHG | HEIGHT: 68 IN | BODY MASS INDEX: 30.58 KG/M2

## 2024-01-02 DIAGNOSIS — G47.33 OSA (OBSTRUCTIVE SLEEP APNEA): Primary | ICD-10-CM

## 2024-01-02 PROCEDURE — 99213 OFFICE O/P EST LOW 20 MIN: CPT | Performed by: PHYSICIAN ASSISTANT

## 2024-01-02 ASSESSMENT — SLEEP AND FATIGUE QUESTIONNAIRES
HOW LIKELY ARE YOU TO NOD OFF OR FALL ASLEEP WHILE SITTING INACTIVE IN A PUBLIC PLACE: MODERATE CHANCE OF DOZING
HOW LIKELY ARE YOU TO NOD OFF OR FALL ASLEEP WHILE SITTING AND TALKING TO SOMEONE: MODERATE CHANCE OF DOZING
HOW LIKELY ARE YOU TO NOD OFF OR FALL ASLEEP IN A CAR, WHILE STOPPED FOR A FEW MINUTES IN TRAFFIC: MODERATE CHANCE OF DOZING
HOW LIKELY ARE YOU TO NOD OFF OR FALL ASLEEP WHILE WATCHING TV: HIGH CHANCE OF DOZING
HOW LIKELY ARE YOU TO NOD OFF OR FALL ASLEEP WHILE SITTING AND READING: HIGH CHANCE OF DOZING
HOW LIKELY ARE YOU TO NOD OFF OR FALL ASLEEP WHILE SITTING QUIETLY AFTER LUNCH WITHOUT ALCOHOL: HIGH CHANCE OF DOZING
HOW LIKELY ARE YOU TO NOD OFF OR FALL ASLEEP WHILE LYING DOWN TO REST IN THE AFTERNOON WHEN CIRCUMSTANCES PERMIT: HIGH CHANCE OF DOZING
HOW LIKELY ARE YOU TO NOD OFF OR FALL ASLEEP WHEN YOU ARE A PASSENGER IN A CAR FOR AN HOUR WITHOUT A BREAK: MODERATE CHANCE OF DOZING

## 2024-01-02 NOTE — NURSING NOTE
Weight management plan: Patient was referred to their PCP to discuss a diet and exercise plan.

## 2024-02-18 ENCOUNTER — HEALTH MAINTENANCE LETTER (OUTPATIENT)
Age: 54
End: 2024-02-18

## 2024-03-15 ENCOUNTER — TELEPHONE (OUTPATIENT)
Dept: SLEEP MEDICINE | Facility: CLINIC | Age: 54
End: 2024-03-15
Payer: COMMERCIAL

## 2024-03-15 DIAGNOSIS — G47.33 OSA (OBSTRUCTIVE SLEEP APNEA): Primary | ICD-10-CM

## 2024-03-15 NOTE — TELEPHONE ENCOUNTER
Patient has decided that she would like to move forward with starting CPAP therapy. Please place device order if appropriate.    Jane GASPAR CMA

## 2024-11-24 ENCOUNTER — HEALTH MAINTENANCE LETTER (OUTPATIENT)
Age: 54
End: 2024-11-24

## 2025-03-09 ENCOUNTER — HEALTH MAINTENANCE LETTER (OUTPATIENT)
Age: 55
End: 2025-03-09